# Patient Record
Sex: MALE
[De-identification: names, ages, dates, MRNs, and addresses within clinical notes are randomized per-mention and may not be internally consistent; named-entity substitution may affect disease eponyms.]

---

## 2018-09-22 ENCOUNTER — HOSPITAL ENCOUNTER (EMERGENCY)
Dept: HOSPITAL 88 - ER | Age: 28
Discharge: HOME | End: 2018-09-22
Payer: SELF-PAY

## 2018-09-22 VITALS — BODY MASS INDEX: 35.21 KG/M2 | HEIGHT: 72 IN | WEIGHT: 260 LBS

## 2018-09-22 DIAGNOSIS — L03.115: ICD-10-CM

## 2018-09-22 DIAGNOSIS — M25.561: Primary | ICD-10-CM

## 2018-09-22 DIAGNOSIS — I10: ICD-10-CM

## 2018-09-22 PROCEDURE — 99282 EMERGENCY DEPT VISIT SF MDM: CPT

## 2018-12-19 ENCOUNTER — HOSPITAL ENCOUNTER (EMERGENCY)
Dept: HOSPITAL 88 - ER | Age: 28
Discharge: LEFT BEFORE DISCHARGE | End: 2018-12-19
Payer: SELF-PAY

## 2018-12-19 VITALS — HEIGHT: 72 IN | BODY MASS INDEX: 35.21 KG/M2 | WEIGHT: 260 LBS

## 2018-12-19 DIAGNOSIS — N50.811: Primary | ICD-10-CM

## 2018-12-19 DIAGNOSIS — I10: ICD-10-CM

## 2018-12-19 DIAGNOSIS — R10.31: ICD-10-CM

## 2018-12-19 LAB
BACTERIA URNS QL MICRO: (no result) /HPF
BILIRUB UR QL: NEGATIVE
CLARITY UR: CLEAR
COLOR UR: YELLOW
DEPRECATED RBC URNS MANUAL-ACNC: (no result) /HPF (ref 0–5)
EPI CELLS URNS QL MICRO: (no result) /LPF
KETONES UR QL STRIP.AUTO: NEGATIVE
LEUKOCYTE ESTERASE UR QL STRIP.AUTO: NEGATIVE
NITRITE UR QL STRIP.AUTO: NEGATIVE
PROT UR QL STRIP.AUTO: NEGATIVE
SP GR UR STRIP: 1.02 (ref 1.01–1.02)
UROBILINOGEN UR STRIP-MCNC: 0.2 MG/DL (ref 0.2–1)
WBC #/AREA URNS HPF: (no result) /HPF (ref 0–5)

## 2018-12-19 PROCEDURE — 87086 URINE CULTURE/COLONY COUNT: CPT

## 2018-12-19 PROCEDURE — 81001 URINALYSIS AUTO W/SCOPE: CPT

## 2018-12-19 PROCEDURE — 76870 US EXAM SCROTUM: CPT

## 2018-12-19 PROCEDURE — 99282 EMERGENCY DEPT VISIT SF MDM: CPT

## 2018-12-19 PROCEDURE — 93976 VASCULAR STUDY: CPT

## 2018-12-19 NOTE — DIAGNOSTIC IMAGING REPORT
EXAM: Testicular Ultrasound



DATE: 12/19/2018 12:00 AM



INDICATION:  

^[id number]

^1345, testicular pain



COMPARISON: None



FINDINGS:



Routine testicular ultrasound was performed.



Right testicle: 34 x 23 x 29 mm.  Flow is present. No testicular mass

identified.



Left testicle: 36 x 19 x 30 mm.  Flow is present. No testicular mass

identified. Small hydrocele.



Right epididymis: 9 x 5 x 8 mm.  Small 4 mm epididymal cyst incidentally noted.



Left epididymis: 7 x 14 x 6 mm. Unremarkable.



Other: Punctate testicular calcifications bilaterally incidentally noted.



IMPRESSION: 

1.  Essentially unremarkable testicular ultrasound with no evidence of mass or

torsion. Please see above for full details.



Signed by: Dr. Seth Finn MD on 12/19/2018 2:25 PM

## 2018-12-21 NOTE — DIAGNOSTIC IMAGING REPORT
EXAM: Testicular Ultrasound



DATE: 12/19/2018 12:00 AM



INDICATION:  

^[id number]

^1345, testicular pain



COMPARISON: None



FINDINGS:



Routine testicular ultrasound was performed.



Right testicle: 34 x 23 x 29 mm.  Flow is present. No testicular mass

identified.



Left testicle: 36 x 19 x 30 mm.  Flow is present. No testicular mass

identified. Small hydrocele.



Right epididymis: 9 x 5 x 8 mm.  Small 4 mm epididymal cyst incidentally noted.



Left epididymis: 7 x 14 x 6 mm. Unremarkable.



Other: Punctate testicular calcifications bilaterally incidentally noted.



IMPRESSION: 

1.  Essentially unremarkable testicular ultrasound with no evidence of mass or

torsion. Please see above for full details.



Signed by: Dr. Seth Finn MD on 12/19/2018 2:25 PM